# Patient Record
Sex: MALE | Race: WHITE | NOT HISPANIC OR LATINO | Employment: OTHER | URBAN - METROPOLITAN AREA
[De-identification: names, ages, dates, MRNs, and addresses within clinical notes are randomized per-mention and may not be internally consistent; named-entity substitution may affect disease eponyms.]

---

## 2019-08-12 ENCOUNTER — APPOINTMENT (EMERGENCY)
Dept: RADIOLOGY | Facility: HOSPITAL | Age: 74
End: 2019-08-12
Payer: MEDICARE

## 2019-08-12 ENCOUNTER — APPOINTMENT (EMERGENCY)
Dept: CT IMAGING | Facility: HOSPITAL | Age: 74
End: 2019-08-12
Payer: MEDICARE

## 2019-08-12 ENCOUNTER — HOSPITAL ENCOUNTER (EMERGENCY)
Facility: HOSPITAL | Age: 74
Discharge: HOME | End: 2019-08-12
Attending: EMERGENCY MEDICINE | Admitting: EMERGENCY MEDICINE
Payer: MEDICARE

## 2019-08-12 VITALS
HEIGHT: 70 IN | HEART RATE: 79 BPM | SYSTOLIC BLOOD PRESSURE: 121 MMHG | RESPIRATION RATE: 22 BRPM | BODY MASS INDEX: 22.33 KG/M2 | OXYGEN SATURATION: 96 % | DIASTOLIC BLOOD PRESSURE: 75 MMHG | WEIGHT: 156 LBS

## 2019-08-12 DIAGNOSIS — J39.8 TRACHEAL NODULE: ICD-10-CM

## 2019-08-12 DIAGNOSIS — R56.9 SEIZURE (HCC): Primary | ICD-10-CM

## 2019-08-12 LAB
ALBUMIN SERPL BCP-MCNC: 4.1 G/DL (ref 3.5–5)
ALP SERPL-CCNC: 77 U/L (ref 46–116)
ALT SERPL W P-5'-P-CCNC: 28 U/L (ref 12–78)
ANION GAP SERPL CALCULATED.3IONS-SCNC: 14 MMOL/L (ref 4–13)
APTT PPP: 40 SECONDS (ref 23–37)
AST SERPL W P-5'-P-CCNC: 37 U/L (ref 5–45)
ATRIAL RATE: 108 BPM
BACTERIA UR QL AUTO: ABNORMAL /HPF
BASOPHILS # BLD AUTO: 0.04 THOUSANDS/ΜL (ref 0–0.1)
BASOPHILS NFR BLD AUTO: 0 % (ref 0–1)
BILIRUB SERPL-MCNC: 0.8 MG/DL (ref 0.2–1)
BILIRUB UR QL STRIP: NEGATIVE
BUN SERPL-MCNC: 18 MG/DL (ref 5–25)
CALCIUM SERPL-MCNC: 10.3 MG/DL (ref 8.3–10.1)
CHLORIDE SERPL-SCNC: 102 MMOL/L (ref 100–108)
CLARITY UR: CLEAR
CLARITY, POC: CLEAR
CO2 SERPL-SCNC: 26 MMOL/L (ref 21–32)
COLOR UR: YELLOW
COLOR, POC: YELLOW
CREAT SERPL-MCNC: 0.76 MG/DL (ref 0.6–1.3)
EOSINOPHIL # BLD AUTO: 0.09 THOUSAND/ΜL (ref 0–0.61)
EOSINOPHIL NFR BLD AUTO: 1 % (ref 0–6)
ERYTHROCYTE [DISTWIDTH] IN BLOOD BY AUTOMATED COUNT: 14.4 % (ref 11.6–15.1)
EXT BILIRUBIN, UA: ABNORMAL
EXT BLOOD URINE: ABNORMAL
EXT GLUCOSE, UA: ABNORMAL
EXT KETONES: ABNORMAL
EXT NITRITE, UA: ABNORMAL
EXT PH, UA: 6.5
EXT PROTEIN, UA: ABNORMAL
EXT SPECIFIC GRAVITY, UA: 1.01
EXT UROBILINOGEN: 0.2
GFR SERPL CREATININE-BSD FRML MDRD: 89 ML/MIN/1.73SQ M
GLUCOSE SERPL-MCNC: 124 MG/DL (ref 65–140)
GLUCOSE UR STRIP-MCNC: NEGATIVE MG/DL
HCT VFR BLD AUTO: 49.2 % (ref 36.5–49.3)
HGB BLD-MCNC: 15.7 G/DL (ref 12–17)
HGB UR QL STRIP.AUTO: ABNORMAL
HYALINE CASTS #/AREA URNS LPF: ABNORMAL /LPF
IMM GRANULOCYTES # BLD AUTO: 0.03 THOUSAND/UL (ref 0–0.2)
IMM GRANULOCYTES NFR BLD AUTO: 0 % (ref 0–2)
INR PPP: 1.04 (ref 0.84–1.19)
KETONES UR STRIP-MCNC: ABNORMAL MG/DL
LACTATE SERPL-SCNC: 2 MMOL/L (ref 0.5–2)
LEUKOCYTE ESTERASE UR QL STRIP: ABNORMAL
LYMPHOCYTES # BLD AUTO: 2.58 THOUSANDS/ΜL (ref 0.6–4.47)
LYMPHOCYTES NFR BLD AUTO: 27 % (ref 14–44)
MCH RBC QN AUTO: 28.3 PG (ref 26.8–34.3)
MCHC RBC AUTO-ENTMCNC: 31.9 G/DL (ref 31.4–37.4)
MCV RBC AUTO: 89 FL (ref 82–98)
MONOCYTES # BLD AUTO: 1.21 THOUSAND/ΜL (ref 0.17–1.22)
MONOCYTES NFR BLD AUTO: 13 % (ref 4–12)
MUCOUS THREADS UR QL AUTO: ABNORMAL
NEUTROPHILS # BLD AUTO: 5.51 THOUSANDS/ΜL (ref 1.85–7.62)
NEUTS SEG NFR BLD AUTO: 59 % (ref 43–75)
NITRITE UR QL STRIP: NEGATIVE
NON-SQ EPI CELLS URNS QL MICRO: ABNORMAL /HPF
NRBC BLD AUTO-RTO: 0 /100 WBCS
P AXIS: 67 DEGREES
PH UR STRIP.AUTO: 6 [PH]
PLATELET # BLD AUTO: 308 THOUSANDS/UL (ref 149–390)
PMV BLD AUTO: 10.7 FL (ref 8.9–12.7)
POTASSIUM SERPL-SCNC: 4.6 MMOL/L (ref 3.5–5.3)
PR INTERVAL: 170 MS
PROT SERPL-MCNC: 7.9 G/DL (ref 6.4–8.2)
PROT UR STRIP-MCNC: ABNORMAL MG/DL
PROTHROMBIN TIME: 13.3 SECONDS (ref 11.6–14.5)
QRS AXIS: -18 DEGREES
QRSD INTERVAL: 82 MS
QT INTERVAL: 330 MS
QTC INTERVAL: 442 MS
RBC # BLD AUTO: 5.55 MILLION/UL (ref 3.88–5.62)
RBC #/AREA URNS AUTO: ABNORMAL /HPF
SODIUM SERPL-SCNC: 142 MMOL/L (ref 136–145)
SP GR UR STRIP.AUTO: 1.02 (ref 1–1.03)
T WAVE AXIS: 27 DEGREES
TROPONIN I SERPL-MCNC: <0.02 NG/ML
UROBILINOGEN UR QL STRIP.AUTO: 1 E.U./DL
VENTRICULAR RATE: 108 BPM
WBC # BLD AUTO: 9.46 THOUSAND/UL (ref 4.31–10.16)
WBC # BLD EST: ABNORMAL 10*3/UL
WBC #/AREA URNS AUTO: ABNORMAL /HPF

## 2019-08-12 PROCEDURE — 99285 EMERGENCY DEPT VISIT HI MDM: CPT

## 2019-08-12 PROCEDURE — 80053 COMPREHEN METABOLIC PANEL: CPT | Performed by: EMERGENCY MEDICINE

## 2019-08-12 PROCEDURE — 84484 ASSAY OF TROPONIN QUANT: CPT | Performed by: EMERGENCY MEDICINE

## 2019-08-12 PROCEDURE — 36415 COLL VENOUS BLD VENIPUNCTURE: CPT | Performed by: EMERGENCY MEDICINE

## 2019-08-12 PROCEDURE — 80177 DRUG SCRN QUAN LEVETIRACETAM: CPT | Performed by: EMERGENCY MEDICINE

## 2019-08-12 PROCEDURE — 70450 CT HEAD/BRAIN W/O DYE: CPT

## 2019-08-12 PROCEDURE — 93005 ELECTROCARDIOGRAM TRACING: CPT

## 2019-08-12 PROCEDURE — 72125 CT NECK SPINE W/O DYE: CPT

## 2019-08-12 PROCEDURE — 81001 URINALYSIS AUTO W/SCOPE: CPT | Performed by: EMERGENCY MEDICINE

## 2019-08-12 PROCEDURE — 85610 PROTHROMBIN TIME: CPT | Performed by: EMERGENCY MEDICINE

## 2019-08-12 PROCEDURE — 87147 CULTURE TYPE IMMUNOLOGIC: CPT | Performed by: EMERGENCY MEDICINE

## 2019-08-12 PROCEDURE — 93010 ELECTROCARDIOGRAM REPORT: CPT | Performed by: INTERNAL MEDICINE

## 2019-08-12 PROCEDURE — 99284 EMERGENCY DEPT VISIT MOD MDM: CPT | Performed by: EMERGENCY MEDICINE

## 2019-08-12 PROCEDURE — 85025 COMPLETE CBC W/AUTO DIFF WBC: CPT | Performed by: EMERGENCY MEDICINE

## 2019-08-12 PROCEDURE — 87040 BLOOD CULTURE FOR BACTERIA: CPT | Performed by: EMERGENCY MEDICINE

## 2019-08-12 PROCEDURE — 85730 THROMBOPLASTIN TIME PARTIAL: CPT | Performed by: EMERGENCY MEDICINE

## 2019-08-12 PROCEDURE — 71045 X-RAY EXAM CHEST 1 VIEW: CPT

## 2019-08-12 PROCEDURE — 83605 ASSAY OF LACTIC ACID: CPT | Performed by: EMERGENCY MEDICINE

## 2019-08-12 RX ORDER — LEVETIRACETAM 500 MG/1
500 TABLET ORAL EVERY 12 HOURS SCHEDULED
COMMUNITY
Start: 2017-04-12 | End: 2019-10-24

## 2019-08-12 RX ORDER — LORAZEPAM 2 MG/ML
1 INJECTION INTRAMUSCULAR ONCE
Status: DISCONTINUED | OUTPATIENT
Start: 2019-08-12 | End: 2019-08-12 | Stop reason: HOSPADM

## 2019-08-12 RX ORDER — ZOLPIDEM TARTRATE 6.25 MG/1
5 TABLET, FILM COATED, EXTENDED RELEASE ORAL
COMMUNITY
Start: 2018-08-12 | End: 2019-10-24

## 2019-08-12 RX ADMIN — Medication 1000 MG: at 05:46

## 2019-08-12 RX ADMIN — SODIUM CHLORIDE 1000 ML: 0.9 INJECTION, SOLUTION INTRAVENOUS at 05:14

## 2019-08-12 NOTE — ED NOTES
Wife states that Pt has lost 25 lbs in the past 3 months unintentionally r/t diet change and tolerance        Travis Tobar RN  08/12/19 6036

## 2019-08-12 NOTE — ED PROVIDER NOTES
History  Chief Complaint   Patient presents with    Seizure - Prior Hx Of     Pt comes to ED following an unwitnessed seizure at home  Wife found Pt on ground after hearing thud and was not able to time the seizure activity  Pt in C-spine collar and is moving with goal to get out of bed  66year old male brought by EMS from home after unwitnessed seizure  Patient's wife had heard a loud noise and checked to find the patient lying on the linoleum floor in the kitchen with generalized seizure activity lasting approximately 5 minutes  Patient's seizure activity resolved prior to EMS arrival; however, patient was confused, staring off and not purposeful  He was given 1 mg Ativan IV by EMS in transit for aggitation  Patient does have history of dementia as well as seizure disorder  Patient's last reported seizure was 2 years ago  He currently takes Keppra for seizures and is prescribed Ambien for sleep  His wife states that he is intermittently compliant with Keppra and did not take his evening dose  Patient is unable to provide any history  History provided by:  EMS personnel  Seizure - Prior Hx Of   Seizure activity on arrival: no    Seizure type:  Grand mal  Initial focality:  Unable to specify  Postictal symptoms: confusion    Return to baseline: yes    Duration:  5 minutes  Timing:  Once  Number of seizures this episode:  2  Progression:  Resolved  Recent head injury:  During the event (presumed, unwitnessed)  PTA treatment:  Lorazepam  History of seizures: yes    Date of most recent prior episode:  2 years ago  Severity:  Mild  Seizure control level: Well controlled  Current therapy:  Levetiracetam  Compliance with current therapy:  Variable      Prior to Admission Medications   Prescriptions Last Dose Informant Patient Reported?  Taking?   levETIRAcetam (KEPPRA) 500 mg tablet  Spouse/Significant Other Yes Yes   Sig: Take 500 mg by mouth every 12 (twelve) hours   zolpidem (AMBIEN CR) 6 25 MG CR tablet Spouse/Significant Other Yes Yes   Sig: Take 5 mg by mouth daily at bedtime as needed for sleep      Facility-Administered Medications: None       Past Medical History:   Diagnosis Date    Encephalitis 1961    Seizures ([address] )        History reviewed  No pertinent surgical history  History reviewed  No pertinent family history  I have reviewed and agree with the history as documented  Social History     Tobacco Use    Smoking status: Former Smoker    Smokeless tobacco: Never Used   Substance Use Topics    Alcohol use: Not Currently     Comment: not since 2017    Drug use: Never        Review of Systems   Unable to perform ROS: Dementia       Physical Exam  Physical Exam   Constitutional: He appears well-developed and well-nourished  Non-toxic appearance  No distress  Cervical collar in place  HENT:   Head: Normocephalic and atraumatic  Mouth/Throat: Mucous membranes are dry  Eyes: Pupils are equal, round, and reactive to light  EOM are normal    Neck: No tracheal deviation present  No thyromegaly present  Cardiovascular: Normal rate, regular rhythm, normal heart sounds and intact distal pulses  Pulmonary/Chest: Effort normal and breath sounds normal    Abdominal: Soft  Bowel sounds are normal  He exhibits no distension  There is no tenderness  Lymphadenopathy:     He has no cervical adenopathy  Neurological: He is alert  He has normal strength  He exhibits normal muscle tone  GCS eye subscore is 4  GCS verbal subscore is 4  GCS motor subscore is 6  Skin: Skin is warm and dry  He is not diaphoretic  Nursing note and vitals reviewed        Vital Signs  ED Triage Vitals [08/12/19 0405]   Temp Pulse Respirations Blood Pressure SpO2   -- (!) 120 22 133/84 95 %      Temp src Heart Rate Source Patient Position - Orthostatic VS BP Location FiO2 (%)   -- Monitor Sitting Right arm --      Pain Score       --           Vitals:    08/12/19 0445 08/12/19 0500 08/12/19 0515 08/12/19 0530   BP: 127/82 105/72 121/75   Pulse: 91 83 83 79   Patient Position - Orthostatic VS:    Lying         Visual Acuity  Visual Acuity      Most Recent Value   L Pupil Size (mm)  3   R Pupil Size (mm)  3          ED Medications  Medications   LORazepam (FOR EMS ONLY) (ATIVAN) 2 mg/mL injection 2 mg (has no administration in time range)   levETIRAcetam (KEPPRA) 1,000 mg in sodium chloride 0 9 % 100 mL IVPB (0 mg Intravenous Stopped 8/12/19 0615)   sodium chloride 0 9 % bolus 1,000 mL (0 mL Intravenous Stopped 8/12/19 0628)       Diagnostic Studies  Results Reviewed     Procedure Component Value Units Date/Time    UA w Reflex to Microscopic w Reflex to Culture [[id number]]  (Abnormal) Collected:  08/12/19 0628    Lab Status:  Final result Specimen:  Urine, Straight Cath Updated:  08/12/19 0645     Color, UA Yellow     Clarity, UA Clear     Specific [place], UA 1 020     pH, UA 6 0     Leukocytes, UA Trace     Nitrite, UA Negative     Protein, UA Trace mg/dl      Glucose, UA Negative mg/dl      Ketones, UA 40 (2+) mg/dl      Urobilinogen, UA 1 0 E U /dl      Bilirubin, UA Negative     Blood, UA Moderate    Urine Microscopic [[id number]] Collected:  08/12/19 0628    Lab Status:   In process Specimen:  Urine, Straight Cath Updated:  08/12/19 0645    POCT urinalysis dipstick [[id number]]  (Abnormal) Resulted:  08/12/19 0623    Lab Status:  Final result Specimen:  Urine Updated:  08/12/19 0625     Color, UA yellow     Clarity, UA clear     Glucose, UA (Ref: Negative) neg     Bilirubin, UA (Ref: Negative) neg     Ketones, UA (Ref: Negative) 80 ++     Spec Grav, UA (Ref:1 003-1 030) 1 015     Blood, UA (Ref: Negative) large +++     pH, UA (Ref: 4 5-8 0) 6 5     Protein, UA (Ref: Negative) trace     Urobilinogen, UA (Ref: 0 2- 1 0) 0 2      Leukocytes, UA (Ref: Negative) neg     Nitrite, UA (Ref: Negative) neg    Lactic acid, plasma [[id number]]  (Normal) Collected:  08/12/19 0419    Lab Status:  Final result Specimen:  Blood from Arm, Right Updated:  08/12/19 0453     LACTIC ACID 2 0 mmol/L     Narrative:       Result may be elevated if tourniquet was used during collection      Troponin I [[id number]]  (Normal) Collected:  08/12/19 0415    Lab Status:  Final result Specimen:  Blood from Arm, Left Updated:  08/12/19 0452     Troponin I <0 02 ng/mL     Comprehensive metabolic panel [[id number]]  (Abnormal) Collected:  08/12/19 0415    Lab Status:  Final result Specimen:  Blood from Arm, Left Updated:  08/12/19 0449     Sodium 142 mmol/L      Potassium 4 6 mmol/L      Chloride 102 mmol/L      CO2 26 mmol/L      ANION GAP 14 mmol/L      BUN 18 mg/dL      Creatinine 0 76 mg/dL      Glucose 124 mg/dL      Calcium 10 3 mg/dL      AST 37 U/L      ALT 28 U/L      Alkaline Phosphatase 77 U/L      Total Protein 7 9 g/dL      Albumin 4 1 g/dL      Total Bilirubin 0 80 mg/dL      eGFR 89 ml/min/1 73sq m     Narrative:       [place] guidelines for Chronic Kidney Disease (CKD):     Stage 1 with normal or high GFR (GFR > 90 mL/min/1 73 square meters)    Stage 2 Mild CKD (GFR = 60-89 mL/min/1 73 square meters)    Stage 3A Moderate CKD (GFR = 45-59 mL/min/1 73 square meters)    Stage 3B Moderate CKD (GFR = 30-44 mL/min/1 73 square meters)    Stage 4 Severe CKD (GFR = 15-29 mL/min/1 73 square meters)    Stage 5 End Stage CKD (GFR <15 mL/min/1 73 square meters)  Note: GFR calculation is accurate only with a steady state creatinine    Protime-INR [[id number]]  (Normal) Collected:  08/12/19 0415    Lab Status:  Final result Specimen:  Blood from Arm, Right Updated:  08/12/19 0445     Protime 13 3 seconds      INR 1 04    APTT [[id number]]  (Abnormal) Collected:  08/12/19 0415    Lab Status:  Final result Specimen:  Blood from Arm, Right Updated:  08/12/19 0445     PTT 40 seconds     CBC and differential [[id number]]  (Abnormal) Collected:  08/12/19 0414    Lab Status:  Final result Specimen:  Blood from Arm, Left Updated:  08/12/19 0429     WBC 9 46 Thousand/uL      RBC 5 55 Million/uL      Hemoglobin 15 7 g/dL      Hematocrit 49 2 %      MCV 89 fL      MCH 28 3 pg      MCHC 31 9 g/dL      RDW 14 4 %      MPV 10 7 fL      Platelets 752 Thousands/uL      nRBC 0 /100 WBCs      Neutrophils Relative 59 %      Immat GRANS % 0 %      Lymphocytes Relative 27 %      Monocytes Relative 13 %      Eosinophils Relative 1 %      Basophils Relative 0 %      Neutrophils Absolute 5 51 Thousands/µL      Immature Grans Absolute 0 03 Thousand/uL      Lymphocytes Absolute 2 58 Thousands/µL      Monocytes Absolute 1 21 Thousand/µL      Eosinophils Absolute 0 09 Thousand/µL      Basophils Absolute 0 04 Thousands/µL     Levetiracetam level [[id number]] Collected:  08/12/19 0414    Lab Status: In process Specimen:  Blood from Arm, Right Updated:  08/12/19 0424    Blood culture #1 [[id number]] Collected:  08/12/19 0415    Lab Status: In process Specimen:  Blood from Arm, Left Updated:  08/12/19 0424    Blood culture #2 [[id number]] Collected:  08/12/19 0415    Lab Status: In process Specimen:  Blood from Arm, Right Updated:  08/12/19 0424                 XR chest 1 view portable   ED Interpretation by Ginger Novoa MD (08/12 0505)   No acute pulmonary pathology      CT cervical spine without contrast   Final Result by Lebron Alberto MD (08/12 0510)      No cervical spine fracture or traumatic malalignment  There is exaggeration of cervical lordosis  Multilevel degenerative changes are present  There is an approximately 7 x 4 x 8 mm nodule along the left hand aspect of the  Nonemergent follow-up is suggested  Workstation performed: [id number]         CT head without contrast   Final Result by Lebron Alberto MD (08/12 0503)      No acute intracranial abnormality  There is mild generalized atrophy                    Workstation performed: [id number]                    Procedures  ECG 12 Lead Documentation Only  Date/Time: 8/12/2019 4:20 AM  Performed by: Lyn Ramirez MD  Authorized by: Lyn Ramirez MD     Indications / Diagnosis:  Seizure  Patient location:  ED  Previous ECG:     Previous ECG:  Unavailable  Interpretation:     Interpretation: abnormal    Rate:     ECG rate:  108    ECG rate assessment: tachycardic    Rhythm:     Rhythm: sinus tachycardia    Ectopy:     Ectopy: none    QRS:     QRS axis:  Normal    QRS intervals:  Normal  Conduction:     Conduction: normal    ST segments:     ST segments:  Normal  T waves:     T waves: normal             ED Course                               MDM  Number of Diagnoses or Management Options  Seizure [facility]): new and requires workup  Tracheal nodule: new and requires workup  Diagnosis management comments: 66year old male with history of dementia and seizure disorder presents for evaluation after seizure within his home  Concern for head strike during seizure  CTH and C-spine negative  C-collar cleared at bedside  Patient did not take his pm keppra dose  1 g IV keppra given  Mild dehydration on exam  1 L NS given  Neurology follow up  Return precautions provided          Amount and/or Complexity of Data Reviewed  Clinical lab tests: ordered and reviewed  Tests in the radiology section of CPT®: ordered and reviewed    Patient Progress  Patient progress: stable      Disposition  Final diagnoses:   Tracheal nodule   Seizure ([facility] )     Time reflects when diagnosis was documented in both MDM as applicable and the Disposition within this note     Time User Action Codes Description Comment    8/12/2019  5:19 AM Izora Huron J Add [J98 8] Tracheal nodule     8/12/2019  5:19 AM Ulises Sings Add [R56 9] Seizure ([facility] [address] )     8/12/2019  5:19 AM Ulises Sings Modify [J98 8] Tracheal nodule     8/12/2019  5:19 AM Ulises Sings Modify [R56 9] Seizure [facility])       ED Disposition     ED Disposition Condition Date/Time Comment    Discharge Stable Mon Aug 12, 2019  6:33 AM Refugious Glez discharge to home/self care             Follow-up Information     Follow up With Specialties Details Why Contact Info Additional Information    Markie Tsai MD Neurology  for further management of seizure disorder, As needed [address] [facility] [phone]       [facility] Neurology Schedule an appointment as soon as possible for a visit in 1 week for further management of seizure disorder [address] [facility] [phone]  121 [facility] Neurology [address], [address],  [address], [facility], [address] [facility], [address] [facility] Emergency Department Emergency Medicine Go to  If symptoms worsen [address] [facility]  [address] ED, [address], [facility], [address] [facility], [address]          Discharge Medication List as of 8/12/2019  6:35 AM      CONTINUE these medications which have NOT CHANGED    Details   levETIRAcetam (KEPPRA) 500 mg tablet Take 500 mg by mouth every 12 (twelve) hours, Starting Wed 4/12/2017, Historical Med      zolpidem (AMBIEN CR) 6 25 MG CR tablet Take 5 mg by mouth daily at bedtime as needed for sleep, Starting Sun 8/12/2018, Historical Med           No discharge procedures on file      ED Provider  Electronically Signed by           Adina Yee MD  08/12/19 1156

## 2019-08-12 NOTE — DISCHARGE INSTRUCTIONS
Recurrent Seizures in Adults   WHAT YOU NEED TO KNOW:   A seizure is an episode of abnormal brain activity  A seizure can cause jerky muscle movements, loss of consciousness, or confusion  Recurrent means you have a seizure more than once  The cause of your seizures may not be known  Recurrent seizures may occur if you do not take antiseizure medicine as directed  Some common triggers are alcohol, drugs, lack of sleep, fever, or a virus  High or low blood sugar levels can also trigger a seizure  DISCHARGE INSTRUCTIONS:   Call 911 or have someone else call for any of the following:   · Your seizure lasts longer than 5 minutes  · You have a second seizure within 24 hours of your first     · You have trouble breathing after a seizure  · You cannot be woken after your seizure  · You have more than 1 seizure before you are fully awake or aware  · You have diabetes or are pregnant and have a seizure  · You have a seizure in water  Return to the emergency department if:   · You are injured during a seizure  Contact your healthcare provider if:   · You have a fever  · You are planning to get pregnant or are currently pregnant  · You have questions or concerns about your condition or care  Medicine:   · Antiepileptic  medicine may be given to control or prevent seizures  Do not  stop taking this medicine without the direction of a healthcare provider  · Take your medicine as directed  Contact your healthcare provider if you think your medicine is not helping or if you have side effects  Tell him of her if you are allergic to any medicine  Keep a list of the medicines, vitamins, and herbs you take  Include the amounts, and when and why you take them  Bring the list or the pill bottles to follow-up visits  Carry your medicine list with you in case of an emergency  Prevent another seizure:   · Take your antiseizure medicine every day at the same time  This will also help reduce side effects  Do not skip any doses  Do not stop taking this medicine unless directed by a healthcare provider  · Manage stress  Stress can trigger a seizure  Exercise can help you reduce stress  Talk to your healthcare provider about exercise that is safe for you  Other ways to manage stress include yoga, meditation, and biofeedback  Illness can be a form of stress  Eat a variety of healthy foods and drink plenty of liquids during an illness  · Set a regular sleep schedule  A lack of sleep can trigger a seizure  Try to go to sleep and wake up at the same times every day  Keep your bedroom quiet and dark  Talk to your healthcare provider if you are having trouble sleeping  · Manage other medical conditions  Manage other health conditions that may increase your risk for a seizure  Keep your blood sugar levels and blood pressure under control  · Limit or do not drink alcohol as directed  Alcohol can trigger a seizure, especially if you drink a large amount at one time  A drink of alcohol is 12 ounces of beer, 1½ ounces of liquor, or 5 ounces of wine  Talk to your healthcare provider about a safe amount of alcohol for you  Your provider may recommend that you do not drink any alcohol  Tell him or her if you need help to quit drinking  Manage recurrent seizures:   · Ask what safety precautions you should take  Talk with your healthcare provider about driving  You may not be able to drive until you are seizure-free for a period of time  You will need to check the law where you live  Also talk to your healthcare provider about swimming and bathing  You may drown or develop life-threatening heart or lung damage if you have a seizure in water  · Tell your friends, family members, and coworkers that you had a seizure  Give them the following instructions to use if you have another seizure:     ¨ Do not panic  ¨ Gently guide me to the floor or a soft surface             ¨ Do not hold me down or put anything in my mouth     ¨ Place me on my side to help prevent me from swallowing saliva or vomit  ¨ Protect me from injury  Remove sharp or hard objects from the area surrounding me, or cushion my head  ¨ Loosen the clothing around my head and neck  ¨ Time how long my seizure lasts  Call 911 if my seizure lasts longer than 5 minutes or if I have a second seizure  ¨ Stay with me until my seizure ends  Let me rest until I am fully awake  ¨ Perform CPR if I stop breathing or you cannot feel my pulse  ¨ Do not give me anything to eat or drink until I am fully awake  Follow up with your healthcare provider or neurologist as directed: You may need more tests to find the cause of your seizure  You may also need tests to check the level of antiseizure medicine in your blood  Your neurologist may need to change or adjust your medicine  Write down your questions so you remember to ask them during your visits  © 2017 [address] [employer] Information is for End User's use only and may not be sold, redistributed or otherwise used for commercial purposes  All illustrations and images included in CareNotes® are the copyrighted property of [employer]  or [employer]  The above information is an  only  It is not intended as medical advice for individual conditions or treatments  Talk to your doctor, nurse or pharmacist before following any medical regimen to see if it is safe and effective for you

## 2019-08-12 NOTE — ED NOTES
Unable to assess patient properly as Pt is unable to respond properly to triage questions   Pt going to CT scan now     Ivania Naqvi RN  08/12/19 2911

## 2019-08-14 NOTE — RESULT ENCOUNTER NOTE
Most likely contaminant  Only 1/2 blood cultures positive  Called patient, but no answer, unable to leave message

## 2019-08-15 LAB
BACTERIA BLD CULT: ABNORMAL
GRAM STN SPEC: ABNORMAL

## 2019-08-16 LAB — LEVETIRACETAM SERPL-MCNC: NORMAL UG/ML

## 2019-08-17 LAB — BACTERIA BLD CULT: NORMAL

## 2019-10-17 ENCOUNTER — TELEPHONE (OUTPATIENT)
Dept: NEUROLOGY | Facility: CLINIC | Age: 74
End: 2019-10-17

## 2019-10-17 NOTE — TELEPHONE ENCOUNTER
Called patient regarding his appointment with Dr Thong Hooks  Advised patient to return my call if he has seen a previous neurologist to request records  Reminded patient bring new patient questionnaire, medication list, and to arrive 15 minutes earlier for registration

## 2019-10-22 NOTE — TELEPHONE ENCOUNTER
Called His previous Neurologist in [facility] ([address] [place] [address]) and they just have a neurology note  No EEG or MRI records  Staff will be faxing records shortly

## 2019-10-24 ENCOUNTER — CONSULT (OUTPATIENT)
Dept: NEUROLOGY | Facility: CLINIC | Age: 74
End: 2019-10-24
Payer: MEDICARE

## 2019-10-24 ENCOUNTER — DOCUMENTATION (OUTPATIENT)
Dept: NEUROLOGY | Facility: CLINIC | Age: 74
End: 2019-10-24

## 2019-10-24 VITALS — DIASTOLIC BLOOD PRESSURE: 72 MMHG | SYSTOLIC BLOOD PRESSURE: 124 MMHG

## 2019-10-24 DIAGNOSIS — G30.1 LATE ONSET ALZHEIMER'S DISEASE WITHOUT BEHAVIORAL DISTURBANCE (HCC): ICD-10-CM

## 2019-10-24 DIAGNOSIS — F02.80 LATE ONSET ALZHEIMER'S DISEASE WITHOUT BEHAVIORAL DISTURBANCE (HCC): ICD-10-CM

## 2019-10-24 DIAGNOSIS — G40.909 NONINTRACTABLE EPILEPSY WITHOUT STATUS EPILEPTICUS, UNSPECIFIED EPILEPSY TYPE (HCC): Primary | ICD-10-CM

## 2019-10-24 PROBLEM — G47.09 OTHER INSOMNIA: Status: ACTIVE | Noted: 2019-10-24

## 2019-10-24 PROBLEM — R26.2 AMBULATORY DYSFUNCTION: Status: ACTIVE | Noted: 2019-10-24

## 2019-10-24 PROBLEM — M48.03 SPINAL STENOSIS OF CERVICOTHORACIC REGION: Status: ACTIVE | Noted: 2019-10-24

## 2019-10-24 PROCEDURE — 99205 OFFICE O/P NEW HI 60 MIN: CPT | Performed by: PSYCHIATRY & NEUROLOGY

## 2019-10-24 RX ORDER — LEVETIRACETAM 100 MG/ML
SOLUTION ORAL
Refills: 1 | COMMUNITY
Start: 2019-08-13 | End: 2019-10-24 | Stop reason: SDUPTHER

## 2019-10-24 RX ORDER — LEVETIRACETAM 100 MG/ML
500 SOLUTION ORAL 2 TIMES DAILY
Qty: 946 ML | Refills: 3 | Status: SHIPPED | OUTPATIENT
Start: 2019-10-24 | End: 2019-11-20

## 2019-10-24 RX ORDER — MIRTAZAPINE 15 MG/1
TABLET, ORALLY DISINTEGRATING ORAL
COMMUNITY
Start: 2019-10-18

## 2019-10-24 NOTE — PROGRESS NOTES
Dr Mare Cristobal saw Glen Wild today in [facility], we would like to get records from a neurologist in [place] that the patient used to see  Patient's wife was unaware of the name of the practice and will give us a call back with info  Had patient's wife(his power of ) fill out [facility] so when she calls back we just have to fill in the name of practice    Scanned into chart

## 2019-10-24 NOTE — PATIENT INSTRUCTIONS
I reviewed the diagnosis of epilepsy, as the predisposition to recurrent unprovoked seizures due to abnormal synchronous activity of brain  The diagnosis is based on the presumed risk of recurrent unprovoked seizures  Which can be made either with a history of 2 unprovoked seizures or clinical event(s) that is consistent with an epileptic seizure, an abnormal EEG with epileptiform discharges, or a structural lesion in the brain  He qualifies for a working diagnosis of epilepsy due to at least two unprovoked seizures  I explained that we do not always know the cause of epilepsy but that other than seizures there are other symptoms that are co-morbid including cognitive impairments, such as memory and language deficits, psychiatric disorders, and medication side effects that may require periodic monitoring  I reviewed that patients with epilepsy frequently have a co-existent mood or anxiety disorder that can worsen with medications or are an independent medical condition  It is not uncommon that patients with epilepsy develop lower self-esteem, anxiety about seizures, difficulty with independence, and suicidal ideation  We frequently do refer patients for behavioral health assessment with a psychiatrist or clinical psychologist for psychotherapy, mindfulness, and medications  About 60-70% of patients with epilepsy can be controlled with the first 1-3 medications or a combination of medications  About a third of patients could be difficult to control with medications and further investigation into the diagnosis, with an inpatient epilepsy monitoring study to confirm the diagnosis of epilepsy or nonepileptic events (that can be cardiac, other neurological conditions, or psychogenic in origin)          Plan:   1 - continue with Levetiracetam 100mg/mL take 5mL twice a day  2 - routine EEG study  3 - check levetiracetam, B1, B12   4 - call the office regarding prior neurology office notes  5 - let us know where he had his MRI brain study so that it can be set to us  6 - ask  to give you community options - respite care  7 - follow-up in 3 months    Consider [facility] for Advance stage Alzheimer's Dementia    [facility] [address]    What to Do If You Witness a Seizure:     Generalized convulsive (called a generalized tonic-clonic or grand mal seizure)  During this seizure, the person may cry out, suddenly stiffen up, make jerking movements, and fall  The person may turn pale or blue from difficulty breathing  Actions:  1  Stay calm  Talk in a soothing voice and if possible keep onlookers away  2  Prevent injury  Move objects away that the person might hit while jerking uncontrollably  3  Time when the seizure starts and ends  Most seizures stop after only a few minutes  4  Turn him or her gently onto one side  This will help keep the airway clear  5  Never place anything in his/her mouth or give him/her anything by mouth during a seizure  -- Do not give the person water, pills, or food until fully alert  6  Loosen tight clothing or jewelry around his/her neck  7  Make the person as comfortable as possible  8  Place something soft under their head  9  Do not hold the person down  If the person having a seizure thrashes around there is no need for you to restrain them  They are more likely to be combative if restrained  Remember to consider your safety as well  10  Keep onlookers away  11  Be sensitive and supportive, and ask others to do the same  12  Stay with the person until he/she is fully alert  Complex partial seizure (confusional spells)  During this kind of seizure, the person may have a glassy stare; give no response or inappropriate responses when questioned; sit, stand, or walk about aimlessly; make lip smacking or chewing motions; fidget with clothes; appear to be drunk, drugged, or confused  Actions:  1  Make sure the person is safe and wont harm themselves    2  Try to remove harmful objects from around the person (tools, utensils, glasses)  3  Do NOT be aggressive or attempt to restrain the person  They are more likely to be combative if restrained  Remember to consider your safety as well  4  Help prevent the person from wandering, and direct the person to chair or safe position  5  Never place anything in his/her mouth or give him/her anything by mouth during a seizure  -- Do not give the person water, pills, or food until fully alert  6  Keep onlookers away  7  Be sensitive and supportive, and ask others to do the same  8  Stay with the person until he/she is fully alert  CALL 911 if:  1  A convulsive seizure lasts more than 5 minutes  2  The person turns blue during the seizure  3  The person does not start breathing after the seizure  Begin mouth to mouth resuscitation if this would occur  4  The person has one seizure right after the other without coming back to normal consciousness between the seizures  5  The person has not regained consciousness or is still confused after 30 minutes  6  You know the person does not have epilepsy  7  You know the person has diabetes or low blood sugar  8  The person is pregnant, ill, or injured  9  The seizure occurred in water, because the person may have inhaled water  10  The person requests an ambulance or medical help  Rescue medication  Your doctor may prescribe a rescue medication such as lorazepam (Ativan), diazepam (Valium / Diastat), or clonazepam (Klonopin) to terminate a seizure or if you have a history of cluster of seizures   Follow the instructions given by your doctor for these medications    Recognizing Common Seizures (examples)   · Simple partial seizures: Isolated twitching, numbness, sweating, dizziness, nausea/vomiting, disturbances to hearing, vision, smell or taste  No loss of consciousness occurs, and the person remains aware of his/her environment     · Complex partial seizures: Staring, motionless, picking at clothes, smacking lips, swallowing repeatedly or wandering around  The person is not aware of their surroundings and is not fully responsive  · Atonic seizures: Drop attacks or sudden, rapid fall to ground with rapid recovery  · Myoclonic seizures: Brief forceful jerks which can affect the whole body or just part of it  · Absence seizures: May appear to be daydreaming or spacing out   The person is momentarily unresponsive and unaware of what is happening around him/her  · Tonic seizures: Stiffening of part or of the entire body  · Generalized Tonic-Clonic Seizures  Grand-mal seizure   Sudden loss of consciousness with body stiffening followed by continuous jerking movements  A blue tinge around the mouth is likely but lack of oxygen is rare  Loss of bladder and/or bowel control may occur
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
## 2019-10-24 NOTE — PROGRESS NOTES
Review of Systems   Constitutional: Positive for appetite change and unexpected weight change  Negative for fever  HENT: Negative  Negative for hearing loss, tinnitus, trouble swallowing and voice change  Sinus problems  Taste and smell changes   Eyes: Negative  Negative for photophobia and pain  Dry eyes   Respiratory: Negative  Negative for shortness of breath  Cardiovascular: Negative  Negative for palpitations  Gastrointestinal: Negative  Negative for nausea and vomiting  Endocrine: Negative  Negative for cold intolerance and heat intolerance  Genitourinary: Negative  Negative for dysuria, frequency and urgency  Musculoskeletal: Positive for gait problem  Negative for myalgias and neck pain  Skin: Negative  Negative for rash  Neurological: Positive for tremors  Negative for dizziness, seizures, syncope, facial asymmetry, speech difficulty, weakness, light-headedness, numbness and headaches  Memory problems   Hematological: Negative  Does not bruise/bleed easily  Psychiatric/Behavioral: Negative  Negative for confusion, hallucinations and sleep disturbance

## 2019-10-28 ENCOUNTER — TELEPHONE (OUTPATIENT)
Dept: NEUROLOGY | Facility: CLINIC | Age: 74
End: 2019-10-28

## 2019-10-28 NOTE — TELEPHONE ENCOUNTER
----- Message from Ely Cho MD sent at 10/24/2019  5:01 PM EDT -----  Regarding: assistance with community resources  New patient seen today  He has severe advanced dementia, alzheimer's type  They are new to [facility] to be closer to their daughter (moved from [place])  Can you assist with community resources such as senior centers, adult day care, and respite care so that his wife can get some rest?  They have arranged for personal assistants on their own  [facility] had already seen them from the primary care office      Jorgejulius Lewis

## 2019-11-20 ENCOUNTER — TELEPHONE (OUTPATIENT)
Dept: NEUROLOGY | Facility: CLINIC | Age: 74
End: 2019-11-20

## 2019-11-20 DIAGNOSIS — G40.909 NONINTRACTABLE EPILEPSY WITHOUT STATUS EPILEPTICUS, UNSPECIFIED EPILEPSY TYPE (HCC): ICD-10-CM

## 2019-11-20 RX ORDER — LEVETIRACETAM 100 MG/ML
1000 SOLUTION ORAL 2 TIMES DAILY
Qty: 600 ML | Refills: 5 | Status: SHIPPED | OUTPATIENT
Start: 2019-11-20 | End: 2019-11-21 | Stop reason: SDUPTHER

## 2019-11-20 NOTE — TELEPHONE ENCOUNTER
Patient's wife Calixto Saldivar reports the patient had a seizure today at 5am, seizure lasted about 1 minute  This was the patient's typical seizure (whole body shaking), patient was in bed- no fall or injury  She did not take patient to the ER  She reports she gave him a "double dose of medicine like in the ER " She gave the patient Keppra 10mL for his AM dose  States he is "fine" now  She is asking if she should give patient a double dose when he has a seizure  Taking Keppra 100mg/mL 5mL BID  Denies sleep deprivation, missed/ new medications, illness, alcohol/ drug use, increased stress  She is aware that patient has labs that need to be drawn  Also states that EEG has not been scheduled yet   Assisted her in scheduling EEG for 11/26 at 9:30am      [phone]  Okay to leave detailed message

## 2019-11-20 NOTE — TELEPHONE ENCOUNTER
We should increase his levetiracetam dose to 1000mg twice a day (take 10mL twice a day of the oral solution)  I'll update the prescription

## 2019-11-21 RX ORDER — LEVETIRACETAM 100 MG/ML
1000 SOLUTION ORAL 2 TIMES DAILY
Qty: 600 ML | Refills: 5 | OUTPATIENT
Start: 2019-11-21

## 2019-11-21 NOTE — TELEPHONE ENCOUNTER
Called pharmacy and phoned prescription to [facility]  Confirmed back prescription

## 2019-11-21 NOTE — TELEPHONE ENCOUNTER
Please phone in the prescription of Levetiracetam to pharmacy  I was not able to send it by [facility]

## 2019-11-21 NOTE — TELEPHONE ENCOUNTER
Called pharmacy and pharmacist did not receive the updated prescription of Levetiracetam  Please resend the updated script

## 2019-11-25 ENCOUNTER — TELEPHONE (OUTPATIENT)
Dept: NEUROLOGY | Facility: CLINIC | Age: 74
End: 2019-11-25

## 2019-11-25 NOTE — TELEPHONE ENCOUNTER
MSW attempted to reach patient's daughter, Cyndi Verma, at [phone]  No answer  MSW left a message requesting callback  Awaiting same  MSW then reached out to patient's wife at [phone] and was able to reach her  Patient's wife stated that patient had received some services from a visiting nurse agency, but that they had stopped already  Patient's wife stated that they have hired 3 different people to come in to offer assistance to patient (1 through an agency and 2 privately)  MSW recommended a referral to the [address] to see if patient would qualify for any services (in home aide assistance, supplemental funding for adult day care, etc ) Patient's wife stated that she had heard her daughter mention this agency, but stated that patient has not yet been connected to them  MSW advised that a representative from the [place] [address] [facility] would need to assess patient to determine if he would qualify for any programs based on his physical level of need and his finances  Patient's wife stated that while in [place], patient had attended a small adult day center which was a good fit for him  Patient's wife stated that they had already looked into adult day programs here in [place], but have found that many have closed within the last few months  MSW offered to mail a listing of the adult day programs to patient's wife  MSW did advise that the representative from the [place] [address] on [facility] may know up and coming options of centers whereas this writer does not  Patient's wife stated that she had stopped into [facility] to inquire if they would be opening an adult day center, but was told that they are not  Patient's wife stated that patient's condition had declined since they moved to [place]  Patient's wife stated that patient may need hospice in the future   MSW provided education about hospice services and what services could potentially be provided, but did advise that a physician would need to place an order for these services  Patient's wife stated that she [facility], but that she had to pay privately for same  MSW did advise that respite is typically private pay  MSW did inquire if patient had a long term insurance plan, but patient's wife stated that he does not  Patient's wife did report that they do have some savings due the sale of their home in [place], which is why they needed to pay privately  Patient's wife is agreeable to a referral to the [facility]  MSW will make referral this date  Patient's wife aware that a representative from the [employer] on Aging will reaching out to her in the near future  Patient's wife wished to report that patient had a seizure this AM and had questions about an upcoming EEG  MSW advised that it would be best for patient's wife to speak with a member of our Clinical Team, and patient's wife was transferred to [place]  MSW did place the listing of the [facility] adult day programs in the mail this date to patient's home address on file  MSW did include this writer's business card and will be available to patient/wife as needed  MSW phoned the [facility] on Aging at [phone]  MSW left message with patient's information for Troy Chávez,   They will contact patient/wife to schedule assessment

## 2019-11-25 NOTE — TELEPHONE ENCOUNTER
Dr Lockhart: FYI -     Patients wife called in with concerns related to seizure patient had this morning  Wife states patient got up at 4:45am to use the bathroom and then came back to bed  Once in bed he had a full body seizure (shaking to all extremities, similar to last seizure)  Wife states it lasted less than 1 minute  Patient then aroused and came back to baseline  Patients wife also questioning Remeron  Stated she was reading about remeron and noticed that it states can possibly cause seizures  They are very happy with the effects of remeron and don't want to change anything, but wondering if this could be related  Current AEDs:  keppra - 10ml(1000mg) BID

## 2019-11-25 NOTE — TELEPHONE ENCOUNTER
Please have him discontinue Remeron for the time being  She should speak to the prescriber of Remeron to consider an alternative medication to help with sleep until we see if his seizures stop recurring  She can increase his Levetiracetam to 12 5mL twice a day (1250mg twice a day)

## 2019-11-25 NOTE — TELEPHONE ENCOUNTER
Spoke with Latoya Ruiz  She is reluctant to make changes at this time  She reports the Remeron is the one thing that has helped him sleep and has been a lifesaver for them both as she is also able to sleep now  He has an EEG tomorrow which she would like to have done and then go from there

## 2019-11-26 ENCOUNTER — HOSPITAL ENCOUNTER (OUTPATIENT)
Dept: NEUROLOGY | Facility: HOSPITAL | Age: 74
Discharge: HOME | End: 2019-11-26
Attending: PSYCHIATRY & NEUROLOGY
Payer: MEDICARE

## 2019-11-26 DIAGNOSIS — G40.909 NONINTRACTABLE EPILEPSY WITHOUT STATUS EPILEPTICUS, UNSPECIFIED EPILEPSY TYPE (HCC): ICD-10-CM

## 2019-11-26 PROCEDURE — 95816 EEG AWAKE AND DROWSY: CPT

## 2019-11-30 ENCOUNTER — HOSPITAL ENCOUNTER (EMERGENCY)
Facility: HOSPITAL | Age: 74
Discharge: HOME | End: 2019-11-30
Attending: EMERGENCY MEDICINE
Payer: MEDICARE

## 2019-11-30 VITALS
SYSTOLIC BLOOD PRESSURE: 152 MMHG | TEMPERATURE: 98.5 F | OXYGEN SATURATION: 98 % | DIASTOLIC BLOOD PRESSURE: 82 MMHG | RESPIRATION RATE: 17 BRPM | HEART RATE: 65 BPM

## 2019-11-30 DIAGNOSIS — G40.919 BREAKTHROUGH SEIZURE (HCC): Primary | ICD-10-CM

## 2019-11-30 DIAGNOSIS — F02.80 ALZHEIMER DISEASE (HCC): ICD-10-CM

## 2019-11-30 DIAGNOSIS — G30.9 ALZHEIMER DISEASE (HCC): ICD-10-CM

## 2019-11-30 LAB
ALBUMIN SERPL BCP-MCNC: 3.7 G/DL (ref 3.5–5)
ALP SERPL-CCNC: 80 U/L (ref 46–116)
ALT SERPL W P-5'-P-CCNC: 26 U/L (ref 12–78)
ANION GAP SERPL CALCULATED.3IONS-SCNC: 7 MMOL/L (ref 4–13)
AST SERPL W P-5'-P-CCNC: 27 U/L (ref 5–45)
BASOPHILS # BLD AUTO: 0.01 THOUSANDS/ΜL (ref 0–0.1)
BASOPHILS NFR BLD AUTO: 0 % (ref 0–1)
BILIRUB SERPL-MCNC: 0.5 MG/DL (ref 0.2–1)
BUN SERPL-MCNC: 17 MG/DL (ref 5–25)
CALCIUM SERPL-MCNC: 9.4 MG/DL (ref 8.3–10.1)
CHLORIDE SERPL-SCNC: 103 MMOL/L (ref 100–108)
CO2 SERPL-SCNC: 29 MMOL/L (ref 21–32)
CREAT SERPL-MCNC: 0.57 MG/DL (ref 0.6–1.3)
EOSINOPHIL # BLD AUTO: 0.04 THOUSAND/ΜL (ref 0–0.61)
EOSINOPHIL NFR BLD AUTO: 1 % (ref 0–6)
ERYTHROCYTE [DISTWIDTH] IN BLOOD BY AUTOMATED COUNT: 15.4 % (ref 11.6–15.1)
GFR SERPL CREATININE-BSD FRML MDRD: 100 ML/MIN/1.73SQ M
GLUCOSE SERPL-MCNC: 113 MG/DL (ref 65–140)
HCT VFR BLD AUTO: 45.5 % (ref 36.5–49.3)
HGB BLD-MCNC: 14.3 G/DL (ref 12–17)
IMM GRANULOCYTES # BLD AUTO: 0.02 THOUSAND/UL (ref 0–0.2)
IMM GRANULOCYTES NFR BLD AUTO: 0 % (ref 0–2)
LYMPHOCYTES # BLD AUTO: 1.02 THOUSANDS/ΜL (ref 0.6–4.47)
LYMPHOCYTES NFR BLD AUTO: 13 % (ref 14–44)
MCH RBC QN AUTO: 27.8 PG (ref 26.8–34.3)
MCHC RBC AUTO-ENTMCNC: 31.4 G/DL (ref 31.4–37.4)
MCV RBC AUTO: 88 FL (ref 82–98)
MONOCYTES # BLD AUTO: 0.6 THOUSAND/ΜL (ref 0.17–1.22)
MONOCYTES NFR BLD AUTO: 8 % (ref 4–12)
NEUTROPHILS # BLD AUTO: 5.97 THOUSANDS/ΜL (ref 1.85–7.62)
NEUTS SEG NFR BLD AUTO: 78 % (ref 43–75)
PLATELET # BLD AUTO: 245 THOUSANDS/UL (ref 149–390)
PMV BLD AUTO: 10.2 FL (ref 8.9–12.7)
POTASSIUM SERPL-SCNC: 4 MMOL/L (ref 3.5–5.3)
PROT SERPL-MCNC: 7.2 G/DL (ref 6.4–8.2)
RBC # BLD AUTO: 5.15 MILLION/UL (ref 3.88–5.62)
SODIUM SERPL-SCNC: 139 MMOL/L (ref 136–145)
WBC # BLD AUTO: 7.66 THOUSAND/UL (ref 4.31–10.16)

## 2019-11-30 PROCEDURE — 99285 EMERGENCY DEPT VISIT HI MDM: CPT

## 2019-11-30 PROCEDURE — 99285 EMERGENCY DEPT VISIT HI MDM: CPT | Performed by: EMERGENCY MEDICINE

## 2019-11-30 PROCEDURE — 80177 DRUG SCRN QUAN LEVETIRACETAM: CPT | Performed by: EMERGENCY MEDICINE

## 2019-11-30 PROCEDURE — 85025 COMPLETE CBC W/AUTO DIFF WBC: CPT | Performed by: EMERGENCY MEDICINE

## 2019-11-30 PROCEDURE — 96374 THER/PROPH/DIAG INJ IV PUSH: CPT

## 2019-11-30 PROCEDURE — 36415 COLL VENOUS BLD VENIPUNCTURE: CPT | Performed by: EMERGENCY MEDICINE

## 2019-11-30 PROCEDURE — 80053 COMPREHEN METABOLIC PANEL: CPT | Performed by: EMERGENCY MEDICINE

## 2019-11-30 PROCEDURE — 96375 TX/PRO/DX INJ NEW DRUG ADDON: CPT

## 2019-11-30 PROCEDURE — 93005 ELECTROCARDIOGRAM TRACING: CPT

## 2019-11-30 PROCEDURE — 96361 HYDRATE IV INFUSION ADD-ON: CPT

## 2019-11-30 RX ORDER — LORAZEPAM 2 MG/ML
1 INJECTION INTRAMUSCULAR ONCE
Status: COMPLETED | OUTPATIENT
Start: 2019-11-30 | End: 2019-11-30

## 2019-11-30 RX ORDER — LORAZEPAM 1 MG/1
1 TABLET ORAL ONCE
Status: DISCONTINUED | OUTPATIENT
Start: 2019-11-30 | End: 2019-11-30

## 2019-11-30 RX ADMIN — LEVETIRACETAM 1500 MG: 100 INJECTION, SOLUTION INTRAVENOUS at 12:27

## 2019-11-30 RX ADMIN — SODIUM CHLORIDE 1000 ML: 0.9 INJECTION, SOLUTION INTRAVENOUS at 10:38

## 2019-11-30 RX ADMIN — LORAZEPAM 1 MG: 2 INJECTION INTRAMUSCULAR; INTRAVENOUS at 11:32

## 2019-11-30 NOTE — ED NOTES
cierra more awake and alert per family  States he is back at baseline        Ruby Monroy, EMRE  11/30/19 9565

## 2019-11-30 NOTE — ED NOTES
Patient wife requesting to "sedate" patient for his comfort   States he is restless and states, "I just can't see him like this can you please just sedate him " Provider was notified by family member for this request       Reba Lopez RN  11/30/19 1854

## 2019-11-30 NOTE — ED PROVIDER NOTES
History  Chief Complaint   Patient presents with    Seizure - Prior Hx Of     pt presents to ER after having 2 seizures at home, prior hx of  patient at baseline per family     History from medics and patient's wife  This 47-year-old male with history of seizure disorder and late onset Alzheimer's dementia comes from home after having 2 witnessed grand mal seizures this morning  He did not fall or injure himself  His wife states these were his typical generalized tonoclonic seizures that lasted less than 1 minute each  He a very brief postictal period and wanted to get up and walk right away  He is at his baseline now with severe dementia  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 Taking?   levETIRAcetam (KEPPRA) 100 mg/mL oral solution   No No   Sig: Take 10 mL (1,000 mg total) by mouth 2 (two) times a day   mirtazapine (REMERON SOL-TAB) 15 mg disintegrating tablet   Yes No      Facility-Administered Medications: None       Past Medical History:   Diagnosis Date    D[facility] )     Encephalitis 1961    Seizures ([facility] )        Past Surgical History:   Procedure Laterality Date    TONSILECTOMY AND ADNOIDECTOMY         Family History   Problem Relation Age of Onset    Leukemia Mother     Brain cancer Father     Schizophrenia Son      I have reviewed and agree with the history as documented      Social History     Tobacco Use    Smoking status: Former Smoker    Smokeless tobacco: Never Used   Substance Use Topics    Alcohol use: Not Currently     Comment: not since 2017    Drug use: Never        Review of Systems   Unable to perform ROS: Dementia   Constitutional: Negative for fever  Respiratory: Negative for cough  Gastrointestinal: Negative for diarrhea and vomiting  Neurological: Positive for tremors, seizures and speech difficulty  Psychiatric/Behavioral: Positive for behavioral problems, confusion and sleep disturbance  Physical Exam  Physical Exam   Constitutional: He appears well-developed and well-nourished  No distress  HENT:   Head: Normocephalic and atraumatic  Right Ear: External ear normal    Left Ear: External ear normal    Nose: Nose normal    Eyes: Conjunctivae and EOM are normal    Neck: Neck supple  No JVD present  No tracheal deviation present  Cardiovascular: Normal rate, regular rhythm and intact distal pulses  Pulmonary/Chest: Effort normal  No respiratory distress  Abdominal: Soft  He exhibits no distension  There is no tenderness  Musculoskeletal: Normal range of motion  He exhibits no tenderness or deformity  Neurological: He is alert  He exhibits abnormal muscle tone  Coordination abnormal    Skin: Skin is warm and dry  Capillary refill takes less than 2 seconds  No rash noted  He is not diaphoretic  Psychiatric:   Agitated  Nonverbal other than answering with short yes or no answers  Uncooperative  Generalized tremor  Nursing note and vitals reviewed        Vital Signs  ED Triage Vitals   Temperature Pulse Respirations Blood Pressure SpO2   11/30/19 0949 11/30/19 0949 11/30/19 0948 11/30/19 0948 11/30/19 0948   (!) 97 °F (36 1 °C) 105 17 (!) 156/111 97 %      Temp Source Heart Rate Source Patient Position - Orthostatic VS BP Location FiO2 (%)   11/30/19 0949 11/30/19 0948 11/30/19 0948 11/30/19 0948 --   Temporal Monitor Lying Right arm       Pain Score       11/30/19 0948       No Pain           Vitals:    11/30/19 1415 11/30/19 1430 11/30/19 1445 11/30/19 1500   BP: 119/67 117/69 137/82 152/82   Pulse: 74 77 69 65   Patient Position - Orthostatic VS:             Visual Acuity  Visual Acuity      Most Recent Value   L Pupil Size (mm)  3   R Pupil Size (mm)  3          ED Medications  Medications   sodium chloride 0 9 % bolus 1,000 mL (0 mL Intravenous Stopped 11/30/19 1229)   LORazepam (ATIVAN) 2 mg/mL injection 1 mg (1 mg Intravenous Given 11/30/19 1132)   levETIRAcetam (KEPPRA) 1,500 mg in sodium chloride 0 9 % 100 mL IVPB (0 mg Intravenous Stopped 11/30/19 1242)       Diagnostic Studies  Results Reviewed     Procedure Component Value Units Date/Time    Comprehensive metabolic panel [[id number]]  (Abnormal) Collected:  11/30/19 1003    Lab Status:  Final result Specimen:  Blood from Arm, Right Updated:  11/30/19 1040     Sodium 139 mmol/L      Potassium 4 0 mmol/L      Chloride 103 mmol/L      CO2 29 mmol/L      ANION GAP 7 mmol/L      BUN 17 mg/dL      Creatinine 0 57 mg/dL      Glucose 113 mg/dL      Calcium 9 4 mg/dL      AST 27 U/L      ALT 26 U/L      Alkaline Phosphatase 80 U/L      Total Protein 7 2 g/dL      Albumin 3 7 g/dL      Total Bilirubin 0 50 mg/dL      eGFR 100 ml/min/1 73sq m     Narrative:       National Kidney Disease Foundation guidelines for Chronic Kidney Disease (CKD):     Stage 1 with normal or high GFR (GFR > 90 mL/min/1 73 square meters)    Stage 2 Mild CKD (GFR = 60-89 mL/min/1 73 square meters)    Stage 3A Moderate CKD (GFR = 45-59 mL/min/1 73 square meters)    Stage 3B Moderate CKD (GFR = 30-44 mL/min/1 73 square meters)    Stage 4 Severe CKD (GFR = 15-29 mL/min/1 73 square meters)    Stage 5 End Stage CKD (GFR <15 mL/min/1 73 square meters)  Note: GFR calculation is accurate only with a steady state creatinine    CBC and differential [[id number]]  (Abnormal) Collected:  11/30/19 1003    Lab Status:  Final result Specimen:  Blood from Arm, Right Updated:  11/30/19 1020     WBC 7 66 Thousand/uL      RBC 5 15 Million/uL      Hemoglobin 14 3 g/dL      Hematocrit 45 5 %      MCV 88 fL      MCH 27 8 pg      MCHC 31 4 g/dL      RDW 15 4 %      MPV 10 2 fL      Platelets 376 Thousands/uL      Neutrophils Relative 78 %      Immat GRANS % 0 %      Lymphocytes Relative 13 %      Monocytes Relative 8 %      Eosinophils Relative 1 %      Basophils Relative 0 %      Neutrophils Absolute 5 97 Thousands/µL      Immature Grans Absolute 0 02 Thousand/uL      Lymphocytes Absolute 1 02 Thousands/µL      Monocytes Absolute 0 60 Thousand/µL      Eosinophils Absolute 0 04 Thousand/µL      Basophils Absolute 0 01 Thousands/µL     Levetiracetam level [[id number]] Collected:  11/30/19 1003    Lab Status: In process Specimen:  Blood from Arm, Right Updated:  11/30/19 1009                 No orders to display              Procedures  ECG 12 Lead Documentation Only  Date/Time: 11/30/2019 10:43 AM  Performed by: Rose Mary Neil DO  Authorized by: Rose Mary Neil DO     Rhythm:     Rhythm: sinus tachycardia    Ectopy:     Ectopy: none    QRS:     QRS axis:  Right    QRS intervals:  Normal  Conduction:     Conduction: normal    ST segments:     ST segments:  Normal  T waves:     T waves: normal             ED Course  ED Course as of Nov 30 1726   Sat Nov 30, 2019   1205 Case discussed with Neurology,   Her old but  He recommends a Keppra loading dose of 1500 milligrams IV and discharge him  If he has another seizure within 24 hours he should go directly to [facility] for the epileptologist evaluation  1320 Patient sedated after Ativan was given  He opens his mouth and nods in response to commands  Breathing is normal and his vital signs are stable  We will observe the patient until he is more awake  Plan is to discharge him to home with his wife                                    MDM  Number of Diagnoses or Management Options  Alzheimer disease ([facility] ): established and worsening  Breakthrough seizure ([facility] ): established and worsening     Amount and/or Complexity of Data Reviewed  Clinical lab tests: ordered and reviewed  Obtain history from someone other than the patient: yes  Review and summarize past medical records: yes  Discuss the patient with other providers: yes  Independent visualization of images, tracings, or specimens: yes        Disposition  Final diagnoses:   Breakthrough seizure ([facility] )   Alzheimer disease ([facility] )     Time reflects when diagnosis was documented in both MDM as applicable and the Disposition within this note     Time User Action Codes Description Comment    11/30/2019 12:45 PM Felicejudy Yumiko Add [G40 919] Breakthrough seizure ([facility] )     11/30/2019 12:46 PM Felicejudy Belden Add [G30 9,  F02 80] Alzheimer disease [facility])       ED Disposition     ED Disposition Condition Date/Time Comment    Discharge Stable Sat Nov 30, 2019 12:45 PM Em Madrigal discharge to home/self care  Follow-up Information     Follow up With Specialties Details Why Contact Info    Rosie Swann MD Neurology Call in 2 days  [address]  [address] [facility] [place]  [phone]            Discharge Medication List as of 11/30/2019  3:15 PM      CONTINUE these medications which have NOT CHANGED    Details   levETIRAcetam (KEPPRA) 100 mg/mL oral solution Take 10 mL (1,000 mg total) by mouth 2 (two) times a day, Starting Thu 11/21/2019, Phone In      mirtazapine (REMERON SOL-TAB) 15 mg disintegrating tablet Starting Fri 10/18/2019, Historical Med           No discharge procedures on file      ED Provider  Electronically Signed by           Bogdan Kim DO  11/30/19 0434

## 2019-11-30 NOTE — DISCHARGE INSTRUCTIONS
Restart his normal dose of Keppra, 1000mg, tonight at the normal time  Continue his Remeron  Follow up with his epilepsy specialist on Monday  If he has another seizure within 24 hours he should go to [facility] for further evaluation and treatment

## 2019-11-30 NOTE — ED NOTES
Paitent cleansed of urine, new brief applied skin care provided  Patient brought clothes for patient to change into, patient assisted into new clothes and wheelchair  Patient then wheeled to private vehicle with wife, this RN and EDT assisted patient into vehicle  Discharge instructions discussed with caregiver        Ezequiel Brown RN  11/30/19 6643

## 2019-11-30 NOTE — ED NOTES
Provider at bedside to assess patient, no further orders at this time  Will continue to monitor patient        Zo Jackson RN  11/30/19 6408

## 2019-12-02 LAB — LEVETIRACETAM SERPL-MCNC: 31.9 UG/ML (ref 10–40)

## 2019-12-02 PROCEDURE — 95816 EEG AWAKE AND DROWSY: CPT | Performed by: PSYCHIATRY & NEUROLOGY

## 2019-12-02 NOTE — TELEPHONE ENCOUNTER
EEG completed today is of poor quality due to patient's restlessness and he pulled off the electrodes  Background slowing can be seen in the setting of dementia  Due to the recent breakthrough seizures does she want to increase his levetiracetam dose?

## 2019-12-02 NOTE — TELEPHONE ENCOUNTER
Called patient's wife, Jb Santos, a women answered the phone and when I asked to speak with Jb Santos, she disconnected the call  Will attempt again tomorrow

## 2019-12-03 LAB
ATRIAL RATE: 102 BPM
P AXIS: 67 DEGREES
PR INTERVAL: 180 MS
QRS AXIS: 164 DEGREES
QRSD INTERVAL: 78 MS
QT INTERVAL: 334 MS
QTC INTERVAL: 435 MS
T WAVE AXIS: 12 DEGREES
VENTRICULAR RATE: 102 BPM

## 2019-12-03 PROCEDURE — 93010 ELECTROCARDIOGRAM REPORT: CPT | Performed by: INTERNAL MEDICINE

## 2019-12-03 NOTE — TELEPHONE ENCOUNTER
If he is acting more strange since the increase of Keppra, I am be more hesitant about increasing the dose  He probably should transition to another antiepileptic medication due to the risk of worsening behaviors, agitation, irritability with higher doses of Keppra  Options are to start with   (1) Divalproex (Depakote), side effects are weight gain, hair loss, drug rash, abdominal pain, tremors, gait impairment, sedation, fatigue, abnormal liver enzymes and bone marrow suppression  Reason to start this medication is that we can increase the dose over a couple of weeks to a therapeutic range  (2) Topiramate (Topamax), side effects are weight loss, lack of appetite, cognitive slowing, kidney stones, risk of acute glaucoma, fevers, fatigue, sedation, kidney insufficiency  All AEDs have warnings about worsening depression, behaviors, agitation, these two medications may help with agitated behaviors

## 2019-12-03 NOTE — TELEPHONE ENCOUNTER
Spoke with patient's wife  She reports the strange behavior occurred one night and then last night he was his normal self  He has not had any recurrent seizure activity  She would like to stay on the 10ml BID for another night to see how he does

## 2019-12-03 NOTE — TELEPHONE ENCOUNTER
Pt's wife Fay Singh called and advised of the below  She verbalized understanding  Refused to repeat eeg at this time  States that over the weekend pt was acting "strange"  More confused, constantly talking but not making sense  He now wears depends, "the other day he tried to get to the bathroom but did not get there in time"  He was smacking his lips a little bit more and tremors (both arms), mostly at night  Pt is taking keppra  keppra 10 ml bid (0700 and 7 pm)  Off of remeron  States that seizures usually happens around 0500  She is agreeable to increase Levetiracetam (Call was interrupted)  Called and left a message to Brigitte's answering machine to increase Levetiracetam  to 12 5 ml bid               [phone] ok to leave detailed message

## 2019-12-05 ENCOUNTER — TELEPHONE (OUTPATIENT)
Dept: NEUROLOGY | Facility: CLINIC | Age: 74
End: 2019-12-05

## 2019-12-05 NOTE — TELEPHONE ENCOUNTER
MSW retrieved a message from patient's wife this date  Patient's wife stated that patient saw his PCP/ Juan R Michael) at [facility] yesterday  Patient's wife stated that hospice was arranged to come in  Patient's wife stated that she also received the packet from [facility] on [address], so she will have help in this regard  Patient's wife stated that she wanted to relay this report to Dr Robb Gifford

## 2019-12-11 NOTE — TELEPHONE ENCOUNTER
Patient's wife reports two small seizures on Saturday involving his leg, but otherwise he is doing ok  He is now under [facility] care

## 2019-12-12 NOTE — TELEPHONE ENCOUNTER
My understanding from his wife is that hospice will be managing his care  She was not looking for any recommendations from our office regarding Saturday's events